# Patient Record
Sex: FEMALE | Race: WHITE | URBAN - METROPOLITAN AREA
[De-identification: names, ages, dates, MRNs, and addresses within clinical notes are randomized per-mention and may not be internally consistent; named-entity substitution may affect disease eponyms.]

---

## 2017-11-06 PROBLEM — N39.0 UTI (URINARY TRACT INFECTION): Status: ACTIVE | Noted: 2017-11-06

## 2017-11-06 PROBLEM — A41.9 SEPSIS (HCC): Status: ACTIVE | Noted: 2017-11-06

## 2017-11-07 PROBLEM — E87.0 ACUTE HYPERNATREMIA: Status: ACTIVE | Noted: 2017-11-07

## 2017-11-07 PROBLEM — E86.0 DEHYDRATION: Status: ACTIVE | Noted: 2017-11-07

## 2017-11-07 PROBLEM — L89.159 DECUBITUS ULCER OF SACRAL REGION: Status: ACTIVE | Noted: 2017-11-07

## 2017-11-08 PROBLEM — E44.1 MILD PROTEIN-CALORIE MALNUTRITION (HCC): Status: ACTIVE | Noted: 2017-11-08

## 2017-11-10 PROBLEM — A41.9 SEPSIS (HCC): Status: RESOLVED | Noted: 2017-11-06 | Resolved: 2017-11-10

## 2017-11-10 PROBLEM — N39.0 UTI (URINARY TRACT INFECTION): Status: RESOLVED | Noted: 2017-11-06 | Resolved: 2017-11-10

## 2018-03-28 ENCOUNTER — HOSPITAL ENCOUNTER (OUTPATIENT)
Dept: INTERVENTIONAL RADIOLOGY/VASCULAR | Age: 78
Discharge: HOME | End: 2018-03-29

## 2018-03-28 VITALS
SYSTOLIC BLOOD PRESSURE: 108 MMHG | HEART RATE: 97 BPM | OXYGEN SATURATION: 98 % | DIASTOLIC BLOOD PRESSURE: 56 MMHG | RESPIRATION RATE: 24 BRPM

## 2018-03-28 DIAGNOSIS — R32 URINARY INCONTINENCE, UNSPECIFIED TYPE: ICD-10-CM

## 2018-03-28 PROBLEM — N10 PYELONEPHRITIS, ACUTE: Status: ACTIVE | Noted: 2018-03-28

## 2018-03-28 PROBLEM — N13.9 UROPATHY, OBSTRUCTIVE: Status: ACTIVE | Noted: 2018-03-28

## 2018-03-28 PROBLEM — R65.20 SEVERE SEPSIS (HCC): Status: ACTIVE | Noted: 2018-03-28

## 2018-03-28 PROBLEM — A41.9 SEVERE SEPSIS (HCC): Status: ACTIVE | Noted: 2018-03-28

## 2018-03-28 RX ORDER — DIPHENHYDRAMINE HYDROCHLORIDE 50 MG/ML
12.5 INJECTION INTRAMUSCULAR; INTRAVENOUS ONCE
Status: COMPLETED | OUTPATIENT
Start: 2018-03-28 | End: 2018-03-28

## 2018-03-28 RX ADMIN — DIPHENHYDRAMINE HYDROCHLORIDE 12.5 MG: 50 INJECTION INTRAMUSCULAR; INTRAVENOUS at 22:03

## 2018-04-12 PROBLEM — E86.0 DEHYDRATION: Status: RESOLVED | Noted: 2017-11-07 | Resolved: 2018-04-12

## 2018-12-23 ENCOUNTER — HOSPITAL ENCOUNTER (INPATIENT)
Age: 78
LOS: 5 days | Discharge: HOME | DRG: 438 | End: 2018-12-28
Attending: EMERGENCY MEDICINE | Admitting: INTERNAL MEDICINE
Payer: MEDICARE

## 2018-12-23 ENCOUNTER — APPOINTMENT (OUTPATIENT)
Dept: CT IMAGING | Age: 78
DRG: 438 | End: 2018-12-23
Payer: MEDICARE

## 2018-12-23 ENCOUNTER — APPOINTMENT (OUTPATIENT)
Dept: GENERAL RADIOLOGY | Age: 78
DRG: 438 | End: 2018-12-23
Payer: MEDICARE

## 2018-12-23 DIAGNOSIS — K85.90 ACUTE PANCREATITIS, UNSPECIFIED COMPLICATION STATUS, UNSPECIFIED PANCREATITIS TYPE: Primary | ICD-10-CM

## 2018-12-23 DIAGNOSIS — R11.2 NON-INTRACTABLE VOMITING WITH NAUSEA, UNSPECIFIED VOMITING TYPE: ICD-10-CM

## 2018-12-23 DIAGNOSIS — N30.01 ACUTE CYSTITIS WITH HEMATURIA: ICD-10-CM

## 2018-12-23 LAB
A/G RATIO: 1.1 (ref 1.1–2.2)
ALBUMIN SERPL-MCNC: 4.1 G/DL (ref 3.4–5)
ALP BLD-CCNC: 55 U/L (ref 40–129)
ALT SERPL-CCNC: 10 U/L (ref 10–40)
AMORPHOUS: ABNORMAL /HPF
ANION GAP SERPL CALCULATED.3IONS-SCNC: 13 MMOL/L (ref 3–16)
AST SERPL-CCNC: 18 U/L (ref 15–37)
BACTERIA: ABNORMAL /HPF
BASOPHILS ABSOLUTE: 0 K/UL (ref 0–0.2)
BASOPHILS RELATIVE PERCENT: 0.2 %
BILIRUB SERPL-MCNC: 0.6 MG/DL (ref 0–1)
BILIRUBIN URINE: NEGATIVE
BLOOD, URINE: ABNORMAL
BUN BLDV-MCNC: 21 MG/DL (ref 7–20)
CALCIUM SERPL-MCNC: 9.7 MG/DL (ref 8.3–10.6)
CHLORIDE BLD-SCNC: 104 MMOL/L (ref 99–110)
CLARITY: ABNORMAL
CO2: 24 MMOL/L (ref 21–32)
COLOR: YELLOW
CREAT SERPL-MCNC: 0.9 MG/DL (ref 0.6–1.2)
EKG ATRIAL RATE: 78 BPM
EKG DIAGNOSIS: NORMAL
EKG P AXIS: 76 DEGREES
EKG P-R INTERVAL: 166 MS
EKG Q-T INTERVAL: 392 MS
EKG QRS DURATION: 96 MS
EKG QTC CALCULATION (BAZETT): 446 MS
EKG R AXIS: -23 DEGREES
EKG T AXIS: -13 DEGREES
EKG VENTRICULAR RATE: 78 BPM
EOSINOPHILS ABSOLUTE: 0 K/UL (ref 0–0.6)
EOSINOPHILS RELATIVE PERCENT: 0.1 %
EPITHELIAL CELLS, UA: ABNORMAL /HPF
GFR AFRICAN AMERICAN: >60
GFR NON-AFRICAN AMERICAN: >60
GLOBULIN: 3.7 G/DL
GLUCOSE BLD-MCNC: 129 MG/DL (ref 70–99)
GLUCOSE URINE: NEGATIVE MG/DL
HCT VFR BLD CALC: 44.2 % (ref 36–48)
HEMOGLOBIN: 15.1 G/DL (ref 12–16)
KETONES, URINE: NEGATIVE MG/DL
LEUKOCYTE ESTERASE, URINE: ABNORMAL
LIPASE: >600 U/L (ref 13–60)
LYMPHOCYTES ABSOLUTE: 0.6 K/UL (ref 1–5.1)
LYMPHOCYTES RELATIVE PERCENT: 5.9 %
MCH RBC QN AUTO: 32.5 PG (ref 26–34)
MCHC RBC AUTO-ENTMCNC: 34.1 G/DL (ref 31–36)
MCV RBC AUTO: 95.1 FL (ref 80–100)
MICROSCOPIC EXAMINATION: YES
MONOCYTES ABSOLUTE: 0.4 K/UL (ref 0–1.3)
MONOCYTES RELATIVE PERCENT: 3.8 %
MUCUS: ABNORMAL /LPF
NEUTROPHILS ABSOLUTE: 9.8 K/UL (ref 1.7–7.7)
NEUTROPHILS RELATIVE PERCENT: 90 %
NITRITE, URINE: POSITIVE
PDW BLD-RTO: 13.4 % (ref 12.4–15.4)
PH UA: 5.5
PLATELET # BLD: 240 K/UL (ref 135–450)
PMV BLD AUTO: 7.5 FL (ref 5–10.5)
POTASSIUM SERPL-SCNC: 4.4 MMOL/L (ref 3.5–5.1)
PROTEIN UA: 100 MG/DL
RBC # BLD: 4.64 M/UL (ref 4–5.2)
RBC UA: ABNORMAL /HPF (ref 0–2)
SODIUM BLD-SCNC: 141 MMOL/L (ref 136–145)
SPECIFIC GRAVITY UA: >=1.03
TOTAL PROTEIN: 7.8 G/DL (ref 6.4–8.2)
TROPONIN: <0.01 NG/ML
URINE REFLEX TO CULTURE: YES
URINE TYPE: ABNORMAL
UROBILINOGEN, URINE: 0.2 E.U./DL
WBC # BLD: 10.9 K/UL (ref 4–11)
WBC UA: >100 /HPF (ref 0–5)

## 2018-12-23 PROCEDURE — 96365 THER/PROPH/DIAG IV INF INIT: CPT

## 2018-12-23 PROCEDURE — 93005 ELECTROCARDIOGRAM TRACING: CPT | Performed by: PHYSICIAN ASSISTANT

## 2018-12-23 PROCEDURE — 2580000003 HC RX 258: Performed by: PHYSICIAN ASSISTANT

## 2018-12-23 PROCEDURE — 6360000002 HC RX W HCPCS: Performed by: INTERNAL MEDICINE

## 2018-12-23 PROCEDURE — 85025 COMPLETE CBC W/AUTO DIFF WBC: CPT

## 2018-12-23 PROCEDURE — 83690 ASSAY OF LIPASE: CPT

## 2018-12-23 PROCEDURE — 87801 DETECT AGNT MULT DNA AMPLI: CPT

## 2018-12-23 PROCEDURE — 2500000003 HC RX 250 WO HCPCS: Performed by: INTERNAL MEDICINE

## 2018-12-23 PROCEDURE — 96375 TX/PRO/DX INJ NEW DRUG ADDON: CPT

## 2018-12-23 PROCEDURE — 87186 SC STD MICRODIL/AGAR DIL: CPT

## 2018-12-23 PROCEDURE — 87077 CULTURE AEROBIC IDENTIFY: CPT

## 2018-12-23 PROCEDURE — 71045 X-RAY EXAM CHEST 1 VIEW: CPT

## 2018-12-23 PROCEDURE — C9113 INJ PANTOPRAZOLE SODIUM, VIA: HCPCS | Performed by: INTERNAL MEDICINE

## 2018-12-23 PROCEDURE — 93010 ELECTROCARDIOGRAM REPORT: CPT | Performed by: INTERNAL MEDICINE

## 2018-12-23 PROCEDURE — 6360000004 HC RX CONTRAST MEDICATION: Performed by: EMERGENCY MEDICINE

## 2018-12-23 PROCEDURE — 87086 URINE CULTURE/COLONY COUNT: CPT

## 2018-12-23 PROCEDURE — 2580000003 HC RX 258: Performed by: INTERNAL MEDICINE

## 2018-12-23 PROCEDURE — 6360000002 HC RX W HCPCS: Performed by: PHYSICIAN ASSISTANT

## 2018-12-23 PROCEDURE — 96361 HYDRATE IV INFUSION ADD-ON: CPT

## 2018-12-23 PROCEDURE — 99285 EMERGENCY DEPT VISIT HI MDM: CPT

## 2018-12-23 PROCEDURE — 81001 URINALYSIS AUTO W/SCOPE: CPT

## 2018-12-23 PROCEDURE — 1200000000 HC SEMI PRIVATE

## 2018-12-23 PROCEDURE — 84484 ASSAY OF TROPONIN QUANT: CPT

## 2018-12-23 PROCEDURE — 80053 COMPREHEN METABOLIC PANEL: CPT

## 2018-12-23 PROCEDURE — 74177 CT ABD & PELVIS W/CONTRAST: CPT

## 2018-12-23 PROCEDURE — 87040 BLOOD CULTURE FOR BACTERIA: CPT

## 2018-12-23 RX ORDER — MORPHINE SULFATE 2 MG/ML
1 INJECTION, SOLUTION INTRAMUSCULAR; INTRAVENOUS EVERY 4 HOURS PRN
Status: DISCONTINUED | OUTPATIENT
Start: 2018-12-23 | End: 2018-12-28 | Stop reason: HOSPADM

## 2018-12-23 RX ORDER — SODIUM CHLORIDE 9 MG/ML
INJECTION, SOLUTION INTRAVENOUS
Status: DISPENSED
Start: 2018-12-23 | End: 2018-12-24

## 2018-12-23 RX ORDER — 0.9 % SODIUM CHLORIDE 0.9 %
500 INTRAVENOUS SOLUTION INTRAVENOUS ONCE
Status: COMPLETED | OUTPATIENT
Start: 2018-12-23 | End: 2018-12-23

## 2018-12-23 RX ORDER — SODIUM CHLORIDE 0.9 % (FLUSH) 0.9 %
10 SYRINGE (ML) INJECTION EVERY 12 HOURS SCHEDULED
Status: DISCONTINUED | OUTPATIENT
Start: 2018-12-23 | End: 2018-12-28 | Stop reason: HOSPADM

## 2018-12-23 RX ORDER — DEXTROSE, SODIUM CHLORIDE, AND POTASSIUM CHLORIDE 5; .9; .15 G/100ML; G/100ML; G/100ML
INJECTION INTRAVENOUS CONTINUOUS
Status: DISCONTINUED | OUTPATIENT
Start: 2018-12-23 | End: 2018-12-27

## 2018-12-23 RX ORDER — ONDANSETRON 2 MG/ML
4 INJECTION INTRAMUSCULAR; INTRAVENOUS EVERY 6 HOURS PRN
Status: DISCONTINUED | OUTPATIENT
Start: 2018-12-23 | End: 2018-12-28 | Stop reason: HOSPADM

## 2018-12-23 RX ORDER — ONDANSETRON 2 MG/ML
4 INJECTION INTRAMUSCULAR; INTRAVENOUS ONCE
Status: COMPLETED | OUTPATIENT
Start: 2018-12-23 | End: 2018-12-23

## 2018-12-23 RX ORDER — SODIUM CHLORIDE 0.9 % (FLUSH) 0.9 %
10 SYRINGE (ML) INJECTION PRN
Status: DISCONTINUED | OUTPATIENT
Start: 2018-12-23 | End: 2018-12-28 | Stop reason: HOSPADM

## 2018-12-23 RX ORDER — PANTOPRAZOLE SODIUM 40 MG/10ML
40 INJECTION, POWDER, LYOPHILIZED, FOR SOLUTION INTRAVENOUS
Status: DISCONTINUED | OUTPATIENT
Start: 2018-12-23 | End: 2018-12-26

## 2018-12-23 RX ADMIN — ENOXAPARIN SODIUM 40 MG: 40 INJECTION SUBCUTANEOUS at 19:47

## 2018-12-23 RX ADMIN — IOPAMIDOL 75 ML: 755 INJECTION, SOLUTION INTRAVENOUS at 14:39

## 2018-12-23 RX ADMIN — ONDANSETRON 4 MG: 2 INJECTION INTRAMUSCULAR; INTRAVENOUS at 14:11

## 2018-12-23 RX ADMIN — SODIUM CHLORIDE 500 ML: 9 INJECTION, SOLUTION INTRAVENOUS at 14:11

## 2018-12-23 RX ADMIN — Medication 10 ML: at 19:47

## 2018-12-23 RX ADMIN — CEFTRIAXONE SODIUM 1 G: 1 INJECTION, POWDER, FOR SOLUTION INTRAMUSCULAR; INTRAVENOUS at 16:59

## 2018-12-23 RX ADMIN — DEXTROSE MONOHYDRATE 500 MG: 50 INJECTION, SOLUTION INTRAVENOUS at 19:48

## 2018-12-23 RX ADMIN — PANTOPRAZOLE SODIUM 40 MG: 40 INJECTION, POWDER, FOR SOLUTION INTRAVENOUS at 19:48

## 2018-12-23 RX ADMIN — POTASSIUM CHLORIDE, DEXTROSE MONOHYDRATE AND SODIUM CHLORIDE: 150; 5; 900 INJECTION, SOLUTION INTRAVENOUS at 19:48

## 2018-12-24 LAB
ANION GAP SERPL CALCULATED.3IONS-SCNC: 7 MMOL/L (ref 3–16)
BUN BLDV-MCNC: 16 MG/DL (ref 7–20)
CALCIUM SERPL-MCNC: 9 MG/DL (ref 8.3–10.6)
CHLORIDE BLD-SCNC: 102 MMOL/L (ref 99–110)
CO2: 25 MMOL/L (ref 21–32)
CREAT SERPL-MCNC: 0.7 MG/DL (ref 0.6–1.2)
GFR AFRICAN AMERICAN: >60
GFR NON-AFRICAN AMERICAN: >60
GLUCOSE BLD-MCNC: 100 MG/DL (ref 70–99)
HCT VFR BLD CALC: 39 % (ref 36–48)
HEMOGLOBIN: 13.4 G/DL (ref 12–16)
MCH RBC QN AUTO: 32.8 PG (ref 26–34)
MCHC RBC AUTO-ENTMCNC: 34.2 G/DL (ref 31–36)
MCV RBC AUTO: 95.8 FL (ref 80–100)
PDW BLD-RTO: 13.2 % (ref 12.4–15.4)
PLATELET # BLD: 193 K/UL (ref 135–450)
PMV BLD AUTO: 7.7 FL (ref 5–10.5)
POTASSIUM REFLEX MAGNESIUM: 4.1 MMOL/L (ref 3.5–5.1)
RBC # BLD: 4.07 M/UL (ref 4–5.2)
REPORT: NORMAL
SODIUM BLD-SCNC: 134 MMOL/L (ref 136–145)
WBC # BLD: 7 K/UL (ref 4–11)

## 2018-12-24 PROCEDURE — G0009 ADMIN PNEUMOCOCCAL VACCINE: HCPCS | Performed by: INTERNAL MEDICINE

## 2018-12-24 PROCEDURE — 85027 COMPLETE CBC AUTOMATED: CPT

## 2018-12-24 PROCEDURE — 90670 PCV13 VACCINE IM: CPT | Performed by: INTERNAL MEDICINE

## 2018-12-24 PROCEDURE — 6360000002 HC RX W HCPCS: Performed by: INTERNAL MEDICINE

## 2018-12-24 PROCEDURE — 80048 BASIC METABOLIC PNL TOTAL CA: CPT

## 2018-12-24 PROCEDURE — 2580000003 HC RX 258: Performed by: INTERNAL MEDICINE

## 2018-12-24 PROCEDURE — 2500000003 HC RX 250 WO HCPCS: Performed by: INTERNAL MEDICINE

## 2018-12-24 PROCEDURE — 36415 COLL VENOUS BLD VENIPUNCTURE: CPT

## 2018-12-24 PROCEDURE — 1200000000 HC SEMI PRIVATE

## 2018-12-24 PROCEDURE — C9113 INJ PANTOPRAZOLE SODIUM, VIA: HCPCS | Performed by: INTERNAL MEDICINE

## 2018-12-24 RX ADMIN — Medication 10 ML: at 09:02

## 2018-12-24 RX ADMIN — PNEUMOCOCCAL 13-VALENT CONJUGATE VACCINE 0.5 ML: 2.2; 2.2; 2.2; 2.2; 2.2; 4.4; 2.2; 2.2; 2.2; 2.2; 2.2; 2.2; 2.2 INJECTION, SUSPENSION INTRAMUSCULAR at 09:02

## 2018-12-24 RX ADMIN — CEFTRIAXONE SODIUM 1 G: 1 INJECTION, POWDER, FOR SOLUTION INTRAMUSCULAR; INTRAVENOUS at 16:54

## 2018-12-24 RX ADMIN — VANCOMYCIN HYDROCHLORIDE 1250 MG: 1 INJECTION, POWDER, LYOPHILIZED, FOR SOLUTION INTRAVENOUS at 17:53

## 2018-12-24 RX ADMIN — ENOXAPARIN SODIUM 40 MG: 40 INJECTION SUBCUTANEOUS at 17:53

## 2018-12-24 RX ADMIN — PANTOPRAZOLE SODIUM 40 MG: 40 INJECTION, POWDER, FOR SOLUTION INTRAVENOUS at 06:07

## 2018-12-24 RX ADMIN — POTASSIUM CHLORIDE, DEXTROSE MONOHYDRATE AND SODIUM CHLORIDE: 150; 5; 900 INJECTION, SOLUTION INTRAVENOUS at 15:41

## 2018-12-24 ASSESSMENT — PAIN SCALES - PAIN ASSESSMENT IN ADVANCED DEMENTIA (PAINAD)
NEGVOCALIZATION: 0
TOTALSCORE: 0
BREATHING: 0
NEGVOCALIZATION: 0
CONSOLABILITY: 0
FACIALEXPRESSION: 0
BODYLANGUAGE: 0
TOTALSCORE: 0
BODYLANGUAGE: 0
BREATHING: 0
FACIALEXPRESSION: 0
CONSOLABILITY: 0

## 2018-12-24 ASSESSMENT — ENCOUNTER SYMPTOMS: COUGH: 0

## 2018-12-24 NOTE — H&P
ENT grossly normal.  NECK:  Supple. No cervical lymph nodes. Thyroid normal.  No carotid  bruit. HEART:  Regular rhythm. No murmur. No gallop. LUNGS:  Slightly diminished air entry, bilateral scattered rhonchi. No  rales. No wheezing. No bronchial breathing  ABDOMEN:  Soft, no distention. Minimal upper abdominal discomfort. No  masses. No ascites. Bowel sounds normal.  CENTRAL NERVOUS SYSTEM:  Difficult to evaluate, especially nonverbal,  appears to move all four extremities. EXTREMITIES:  No ankle edema. No skin rash. No calf tenderness. Joints normal.    ADMISSION IMPRESSION:  1. Acute pancreatitis of unclear origin. 2.  Urinary tract infection. 3.  Possible pneumonia. 4.  Possible dementia. PLAN:  Admit the patient to medical unit for close observation. IV  fluid. Follow blood culture report and urine culture report. Broad-spectrum antibiotic coverage. Further GI workup as needed.         Tainsha Bond MD    D: 12/24/2018 11:31:35       T: 12/24/2018 12:09:26     [id number]  Job#: [id number]     Doc#: [id number]    CC:

## 2018-12-25 LAB
A/G RATIO: 1.2 (ref 1.1–2.2)
ALBUMIN SERPL-MCNC: 3.6 G/DL (ref 3.4–5)
ALP BLD-CCNC: 44 U/L (ref 40–129)
ALT SERPL-CCNC: 9 U/L (ref 10–40)
ANION GAP SERPL CALCULATED.3IONS-SCNC: 11 MMOL/L (ref 3–16)
AST SERPL-CCNC: 24 U/L (ref 15–37)
BILIRUB SERPL-MCNC: 0.4 MG/DL (ref 0–1)
BUN BLDV-MCNC: 6 MG/DL (ref 7–20)
CALCIUM SERPL-MCNC: 8.2 MG/DL (ref 8.3–10.6)
CHLORIDE BLD-SCNC: 103 MMOL/L (ref 99–110)
CO2: 24 MMOL/L (ref 21–32)
CREAT SERPL-MCNC: 0.7 MG/DL (ref 0.6–1.2)
GFR AFRICAN AMERICAN: >60
GFR NON-AFRICAN AMERICAN: >60
GLOBULIN: 2.9 G/DL
GLUCOSE BLD-MCNC: 103 MG/DL (ref 70–99)
LIPASE: 24 U/L (ref 13–60)
ORGANISM: ABNORMAL
POTASSIUM SERPL-SCNC: 4.1 MMOL/L (ref 3.5–5.1)
SODIUM BLD-SCNC: 138 MMOL/L (ref 136–145)
TOTAL PROTEIN: 6.5 G/DL (ref 6.4–8.2)
URINE CULTURE, ROUTINE: ABNORMAL
URINE CULTURE, ROUTINE: ABNORMAL

## 2018-12-25 PROCEDURE — 6360000002 HC RX W HCPCS: Performed by: INTERNAL MEDICINE

## 2018-12-25 PROCEDURE — C9113 INJ PANTOPRAZOLE SODIUM, VIA: HCPCS | Performed by: INTERNAL MEDICINE

## 2018-12-25 PROCEDURE — 36415 COLL VENOUS BLD VENIPUNCTURE: CPT

## 2018-12-25 PROCEDURE — 2500000003 HC RX 250 WO HCPCS: Performed by: INTERNAL MEDICINE

## 2018-12-25 PROCEDURE — 83690 ASSAY OF LIPASE: CPT

## 2018-12-25 PROCEDURE — 1200000000 HC SEMI PRIVATE

## 2018-12-25 PROCEDURE — 2580000003 HC RX 258: Performed by: INTERNAL MEDICINE

## 2018-12-25 PROCEDURE — 80053 COMPREHEN METABOLIC PANEL: CPT

## 2018-12-25 RX ADMIN — VANCOMYCIN HYDROCHLORIDE 1250 MG: 1 INJECTION, POWDER, LYOPHILIZED, FOR SOLUTION INTRAVENOUS at 18:21

## 2018-12-25 RX ADMIN — ENOXAPARIN SODIUM 40 MG: 40 INJECTION SUBCUTANEOUS at 18:21

## 2018-12-25 RX ADMIN — POTASSIUM CHLORIDE, DEXTROSE MONOHYDRATE AND SODIUM CHLORIDE: 150; 5; 900 INJECTION, SOLUTION INTRAVENOUS at 21:01

## 2018-12-25 RX ADMIN — PANTOPRAZOLE SODIUM 40 MG: 40 INJECTION, POWDER, FOR SOLUTION INTRAVENOUS at 06:54

## 2018-12-25 RX ADMIN — CEFTRIAXONE SODIUM 1 G: 1 INJECTION, POWDER, FOR SOLUTION INTRAMUSCULAR; INTRAVENOUS at 17:33

## 2018-12-25 ASSESSMENT — PAIN SCALES - PAIN ASSESSMENT IN ADVANCED DEMENTIA (PAINAD)
CONSOLABILITY: 0
NEGVOCALIZATION: 0
CONSOLABILITY: 0
FACIALEXPRESSION: 0
NEGVOCALIZATION: 0
BREATHING: 0
TOTALSCORE: 0
CONSOLABILITY: 0
NEGVOCALIZATION: 0
BODYLANGUAGE: 0
BREATHING: 0
NEGVOCALIZATION: 0
TOTALSCORE: 0
NEGVOCALIZATION: 0
CONSOLABILITY: 0
NEGVOCALIZATION: 0
NEGVOCALIZATION: 0
BODYLANGUAGE: 0
BREATHING: 0
BREATHING: 0
FACIALEXPRESSION: 0
CONSOLABILITY: 0
BREATHING: 0
BODYLANGUAGE: 0
TOTALSCORE: 0
CONSOLABILITY: 0
BREATHING: 0
CONSOLABILITY: 0
FACIALEXPRESSION: 0
CONSOLABILITY: 0
CONSOLABILITY: 0
NEGVOCALIZATION: 0
BREATHING: 0
BODYLANGUAGE: 0
TOTALSCORE: 0
BODYLANGUAGE: 0
TOTALSCORE: 0
TOTALSCORE: 0
BODYLANGUAGE: 0
FACIALEXPRESSION: 0
BODYLANGUAGE: 0
TOTALSCORE: 0
NEGVOCALIZATION: 0
FACIALEXPRESSION: 0
NEGVOCALIZATION: 0
FACIALEXPRESSION: 0
NEGVOCALIZATION: 0
NEGVOCALIZATION: 0
FACIALEXPRESSION: 0
CONSOLABILITY: 0
TOTALSCORE: 0
BREATHING: 0
BODYLANGUAGE: 0
TOTALSCORE: 0
FACIALEXPRESSION: 0
FACIALEXPRESSION: 0
TOTALSCORE: 0
BREATHING: 0
BODYLANGUAGE: 0
BODYLANGUAGE: 0
FACIALEXPRESSION: 0
FACIALEXPRESSION: 0
BODYLANGUAGE: 0
CONSOLABILITY: 0
BODYLANGUAGE: 0
BREATHING: 0
NEGVOCALIZATION: 0
FACIALEXPRESSION: 0
BREATHING: 0
CONSOLABILITY: 0
FACIALEXPRESSION: 0
BODYLANGUAGE: 0
BREATHING: 0
BREATHING: 0
CONSOLABILITY: 0

## 2018-12-25 ASSESSMENT — ENCOUNTER SYMPTOMS: NAUSEA: 0

## 2018-12-26 LAB
CULTURE, BLOOD 2: ABNORMAL
ORGANISM: ABNORMAL
ORGANISM: ABNORMAL

## 2018-12-26 PROCEDURE — 1200000000 HC SEMI PRIVATE

## 2018-12-26 PROCEDURE — 6360000002 HC RX W HCPCS: Performed by: INTERNAL MEDICINE

## 2018-12-26 PROCEDURE — 2500000003 HC RX 250 WO HCPCS: Performed by: INTERNAL MEDICINE

## 2018-12-26 PROCEDURE — C9113 INJ PANTOPRAZOLE SODIUM, VIA: HCPCS | Performed by: INTERNAL MEDICINE

## 2018-12-26 PROCEDURE — 2580000003 HC RX 258: Performed by: INTERNAL MEDICINE

## 2018-12-26 RX ORDER — PANTOPRAZOLE SODIUM 40 MG/1
40 TABLET, DELAYED RELEASE ORAL
Status: DISCONTINUED | OUTPATIENT
Start: 2018-12-27 | End: 2018-12-28 | Stop reason: HOSPADM

## 2018-12-26 RX ADMIN — ENOXAPARIN SODIUM 40 MG: 40 INJECTION SUBCUTANEOUS at 17:37

## 2018-12-26 RX ADMIN — Medication 10 ML: at 08:40

## 2018-12-26 RX ADMIN — POTASSIUM CHLORIDE, DEXTROSE MONOHYDRATE AND SODIUM CHLORIDE: 150; 5; 900 INJECTION, SOLUTION INTRAVENOUS at 20:02

## 2018-12-26 RX ADMIN — CEFTRIAXONE SODIUM 1 G: 1 INJECTION, POWDER, FOR SOLUTION INTRAMUSCULAR; INTRAVENOUS at 17:37

## 2018-12-26 RX ADMIN — Medication 10 ML: at 20:02

## 2018-12-26 RX ADMIN — PANTOPRAZOLE SODIUM 40 MG: 40 INJECTION, POWDER, FOR SOLUTION INTRAVENOUS at 05:16

## 2018-12-26 RX ADMIN — POTASSIUM CHLORIDE, DEXTROSE MONOHYDRATE AND SODIUM CHLORIDE: 150; 5; 900 INJECTION, SOLUTION INTRAVENOUS at 10:07

## 2018-12-26 ASSESSMENT — PAIN SCALES - PAIN ASSESSMENT IN ADVANCED DEMENTIA (PAINAD)
NEGVOCALIZATION: 0
FACIALEXPRESSION: 0
NEGVOCALIZATION: 0
BODYLANGUAGE: 0
CONSOLABILITY: 0
BODYLANGUAGE: 0
FACIALEXPRESSION: 0
FACIALEXPRESSION: 0
NEGVOCALIZATION: 0
BODYLANGUAGE: 0
NEGVOCALIZATION: 0
BODYLANGUAGE: 0
BREATHING: 0
CONSOLABILITY: 0
FACIALEXPRESSION: 0
TOTALSCORE: 0
FACIALEXPRESSION: 0
BREATHING: 0
CONSOLABILITY: 0
FACIALEXPRESSION: 0
TOTALSCORE: 0
CONSOLABILITY: 0
NEGVOCALIZATION: 0
NEGVOCALIZATION: 0
TOTALSCORE: 0
BODYLANGUAGE: 0
BREATHING: 0
BODYLANGUAGE: 0
TOTALSCORE: 0
BODYLANGUAGE: 0
TOTALSCORE: 0
BODYLANGUAGE: 0
FACIALEXPRESSION: 0
CONSOLABILITY: 0
FACIALEXPRESSION: 0
NEGVOCALIZATION: 0
TOTALSCORE: 0
BREATHING: 0
TOTALSCORE: 0
FACIALEXPRESSION: 0
TOTALSCORE: 0
BODYLANGUAGE: 0
NEGVOCALIZATION: 0
TOTALSCORE: 0
CONSOLABILITY: 0
CONSOLABILITY: 0
TOTALSCORE: 0
BREATHING: 0
BREATHING: 0
CONSOLABILITY: 0
CONSOLABILITY: 0
BODYLANGUAGE: 0
BREATHING: 0
NEGVOCALIZATION: 0
NEGVOCALIZATION: 0
TOTALSCORE: 0
CONSOLABILITY: 0
BREATHING: 0
BODYLANGUAGE: 0
NEGVOCALIZATION: 0
CONSOLABILITY: 0

## 2018-12-26 ASSESSMENT — ENCOUNTER SYMPTOMS: NAUSEA: 0

## 2018-12-26 NOTE — CARE COORDINATION
INTERDISCIPLINARY PLAN OF CARE CONFERENCE    Date/Time: 12/26/2018 3:06 PM  Completed by: Reggie Ewing, Case Management      Patient Name:  Pk Esposito  YOB: 1940  Admitting Diagnosis: Pancreatitis, unspecified pancreatitis type [K85.90]     Admit Date/Time:  12/23/2018 12:09 PM    Chart reviewed. Interdisciplinary team met to discuss patient progress and discharge plans. Disciplines included Case Management, Nursing, and Dietitian. Current Status:stable    PT/OT recommendation:n/a    Anticipated Discharge Date: TBD  Expected D/C Disposition:  Nursing Home  Confirmed plan with patient and/or family Yes  Discharge Plan Comments: Reviewed chart. Plan continues [facility] L[facility],+[facility]OC. Crystal Romano with [facility] states pt can return at d/c. Will need to verify d/c plan with family. Following.           Home O2 in place on admit: to ECF  Pt informed of need to bring portable home O2 tank on day of discharge for nursing to connect prior to leaving:  Not Indicated  Verbalized agreement/Understanding:  Not Indicated

## 2018-12-26 NOTE — ED PROVIDER NOTES
I independently performed a history and physical on Deisy Murphy. All diagnostic, treatment, and disposition decisions were made by myself in conjunction with the advanced practice provider. For further details of Chantelle Su's emergency department encounter, please see the advance practice provider's documentation. In brief, this is a 66y.o. year old female, with   Chief Complaint   Patient presents with    Emesis     Per squad, pt woke up this morning with nausea and vomiting. EMT was informed that pt is normally alert and talkative. Pt is not attempting to communicate at this time. Patient is non-verbal from a nursing facility. EMS brought the patient in for vomiting. On exam, patient is chronically ill in appearance but does not appear to be in acute distress. Her epigastrium is tender, but soft. CTAB.  RRR. Imaging shows acute cystitis, and lab analysis shows acute pancreatitis. Patient was started on antibiotics and admitted. EKG Interpretation    Interpreted by emergency department physician    Rhythm: normal sinus   Rate: normal  Axis: normal  Ectopy: none  Conduction: normal  ST Segments: nonspecific changes  T Waves: non specific changes  Q Waves: none    Clinical Impression: normal sinus rhythm, non-specific st and t wave changes.     Letty Roberson MD  12/26/18 2690
and no mass. There is no tenderness. There is no rigidity, no rebound, no guarding, no tenderness at McBurney's point and negative Kaiser's sign. No hernia. Musculoskeletal: She exhibits no edema. Neurological: She is alert and oriented to person, place, and time. Skin: Skin is warm and dry. She is not diaphoretic. No pallor. Psychiatric: She has a normal mood and affect. Her behavior is normal.   Nursing note and vitals reviewed. DIAGNOSTIC RESULTS   LABS:    Labs Reviewed   CBC WITH AUTO DIFFERENTIAL   COMPREHENSIVE METABOLIC PANEL   LIPASE   URINE RT REFLEX TO CULTURE   TROPONIN       All other labs were within normal range or not returned as of this dictation. EKG: All EKG's are interpreted by the Emergency Department Physician who either signs orCo-signs this chart in the absence of a cardiologist.  Please see their note for interpretation of EKG. RADIOLOGY:   Non-plain film images such as CT, Ultrasound and MRI are read by the radiologist. Plain radiographic images are visualized andpreliminarily interpreted by the  ED Provider with the below findings:        Interpretation perthe Radiologist below, if available at the time of this note:    CT ABDOMEN PELVIS W IV CONTRAST Additional Contrast? None   Final Result   1. Although relatively decompressed, there is greater than expected mucosal   thickening of the urinary bladder. Findings may reflect nonspecific   cystitis. Correlation with urinalysis is recommended. 2. Fluid-filled nondilated small bowel and colon is suggestive of diarrheal   state/nonspecific enterocolitis. 3. Moderate to large hiatal hernia, suspected paraesophageal type. upper GI   examination could be considered for further evaluation, as indicated. XR CHEST PORTABLE   Final Result   1. Possible right lower lobe infiltrate could represent pneumonia   2. Pulmonary vascular congestion           No results found.       PROCEDURES   Unless otherwise noted below,

## 2018-12-27 LAB
A/G RATIO: 1.4 (ref 1.1–2.2)
ALBUMIN SERPL-MCNC: 3.7 G/DL (ref 3.4–5)
ALP BLD-CCNC: 42 U/L (ref 40–129)
ALT SERPL-CCNC: 9 U/L (ref 10–40)
ANION GAP SERPL CALCULATED.3IONS-SCNC: 12 MMOL/L (ref 3–16)
AST SERPL-CCNC: 18 U/L (ref 15–37)
BASOPHILS ABSOLUTE: 0 K/UL (ref 0–0.2)
BASOPHILS RELATIVE PERCENT: 0.3 %
BILIRUB SERPL-MCNC: 0.4 MG/DL (ref 0–1)
BUN BLDV-MCNC: 4 MG/DL (ref 7–20)
CALCIUM SERPL-MCNC: 8.9 MG/DL (ref 8.3–10.6)
CHLORIDE BLD-SCNC: 104 MMOL/L (ref 99–110)
CO2: 25 MMOL/L (ref 21–32)
CREAT SERPL-MCNC: 0.6 MG/DL (ref 0.6–1.2)
EOSINOPHILS ABSOLUTE: 0.2 K/UL (ref 0–0.6)
EOSINOPHILS RELATIVE PERCENT: 3.7 %
GFR AFRICAN AMERICAN: >60
GFR NON-AFRICAN AMERICAN: >60
GLOBULIN: 2.6 G/DL
GLUCOSE BLD-MCNC: 95 MG/DL (ref 70–99)
HCT VFR BLD CALC: 38.2 % (ref 36–48)
HEMOGLOBIN: 13.1 G/DL (ref 12–16)
LYMPHOCYTES ABSOLUTE: 1.3 K/UL (ref 1–5.1)
LYMPHOCYTES RELATIVE PERCENT: 23.2 %
MCH RBC QN AUTO: 32.7 PG (ref 26–34)
MCHC RBC AUTO-ENTMCNC: 34.3 G/DL (ref 31–36)
MCV RBC AUTO: 95.2 FL (ref 80–100)
MONOCYTES ABSOLUTE: 0.5 K/UL (ref 0–1.3)
MONOCYTES RELATIVE PERCENT: 9.4 %
NEUTROPHILS ABSOLUTE: 3.7 K/UL (ref 1.7–7.7)
NEUTROPHILS RELATIVE PERCENT: 63.4 %
PDW BLD-RTO: 12.9 % (ref 12.4–15.4)
PLATELET # BLD: 202 K/UL (ref 135–450)
PMV BLD AUTO: 7.4 FL (ref 5–10.5)
POTASSIUM SERPL-SCNC: 3.8 MMOL/L (ref 3.5–5.1)
RBC # BLD: 4.01 M/UL (ref 4–5.2)
SODIUM BLD-SCNC: 141 MMOL/L (ref 136–145)
TOTAL PROTEIN: 6.3 G/DL (ref 6.4–8.2)
WBC # BLD: 5.8 K/UL (ref 4–11)

## 2018-12-27 PROCEDURE — 2580000003 HC RX 258: Performed by: INTERNAL MEDICINE

## 2018-12-27 PROCEDURE — 85025 COMPLETE CBC W/AUTO DIFF WBC: CPT

## 2018-12-27 PROCEDURE — 80053 COMPREHEN METABOLIC PANEL: CPT

## 2018-12-27 PROCEDURE — 6360000002 HC RX W HCPCS: Performed by: INTERNAL MEDICINE

## 2018-12-27 PROCEDURE — 6370000000 HC RX 637 (ALT 250 FOR IP): Performed by: INTERNAL MEDICINE

## 2018-12-27 PROCEDURE — 36415 COLL VENOUS BLD VENIPUNCTURE: CPT

## 2018-12-27 PROCEDURE — 1200000000 HC SEMI PRIVATE

## 2018-12-27 RX ADMIN — PANTOPRAZOLE SODIUM 40 MG: 40 TABLET, DELAYED RELEASE ORAL at 06:17

## 2018-12-27 RX ADMIN — CEFTRIAXONE SODIUM 1 G: 1 INJECTION, POWDER, FOR SOLUTION INTRAMUSCULAR; INTRAVENOUS at 16:28

## 2018-12-27 RX ADMIN — ENOXAPARIN SODIUM 40 MG: 40 INJECTION SUBCUTANEOUS at 17:35

## 2018-12-27 RX ADMIN — Medication 10 ML: at 21:18

## 2018-12-27 ASSESSMENT — ENCOUNTER SYMPTOMS
SHORTNESS OF BREATH: 0
COUGH: 0

## 2018-12-28 VITALS
HEART RATE: 77 BPM | WEIGHT: 172.7 LBS | TEMPERATURE: 98.5 F | DIASTOLIC BLOOD PRESSURE: 88 MMHG | RESPIRATION RATE: 16 BRPM | OXYGEN SATURATION: 96 % | BODY MASS INDEX: 33.91 KG/M2 | SYSTOLIC BLOOD PRESSURE: 126 MMHG | HEIGHT: 60 IN

## 2018-12-28 LAB — BLOOD CULTURE, ROUTINE: NORMAL

## 2018-12-28 PROCEDURE — 6370000000 HC RX 637 (ALT 250 FOR IP): Performed by: INTERNAL MEDICINE

## 2018-12-28 RX ORDER — CEPHALEXIN 250 MG/1
250 CAPSULE ORAL EVERY 6 HOURS SCHEDULED
Status: DISCONTINUED | OUTPATIENT
Start: 2018-12-28 | End: 2018-12-28 | Stop reason: HOSPADM

## 2018-12-28 RX ORDER — CEPHALEXIN 250 MG/1
250 CAPSULE ORAL 4 TIMES DAILY
Qty: 40 CAPSULE | Refills: 0 | Status: SHIPPED | OUTPATIENT
Start: 2018-12-28

## 2018-12-28 RX ADMIN — CEPHALEXIN 250 MG: 250 CAPSULE ORAL at 11:40

## 2018-12-28 RX ADMIN — PANTOPRAZOLE SODIUM 40 MG: 40 TABLET, DELAYED RELEASE ORAL at 06:05

## 2018-12-28 ASSESSMENT — ENCOUNTER SYMPTOMS
SHORTNESS OF BREATH: 0
NAUSEA: 0
COUGH: 0

## 2018-12-28 NOTE — PROGRESS NOTES
4 Eyes Skin Assessment     The patient is being assess for   Admission    I agree that 2 RN's have performed a thorough Head to Toe Skin Assessment on the patient. ALL assessment sites listed below have been assessed. Areas assessed by both nurses:   [x]   Head, Face, and Ears:  Small healing abrasion left forehead   [x]   Shoulders, Back, and Chest, Abdomen  [x]   Arms, Elbows, and Hands   [x]   Coccyx, Sacrum, and Ischium:  Blanchable redness coccyx  [x]   Legs, Feet, and Heels            **SHARE this note so that the co-signing nurse is able to place an eSignature**    Co-signer eSignature: Electronically signed by Noe Sahu RN on 12/24/18 at 3:21 AM    Does the Patient have Skin Breakdown?   No          Kailash Prevention initiated:  Yes   Wound Care Orders initiated:  NA      [facility] nurse consulted for Pressure Injury (Stage 3,4, Unstageable, DTI, NWPT, Complex wounds)and New or Established Ostomies:  NA      Primary Nurse eSignature: Electronically signed by Gabriela Mabry RN on 12/23/18 at 11:06 PM
AM assessment complete. No am meds due. Alert to self. Telesitter in place. Call light within reach.
Bedside report given to [facility]  pt in stable condition no needs at this time.  Call light within reach
Fidel Shelton is a 66 y.o. female patient. Current Facility-Administered Medications   Medication Dose Route Frequency Provider Last Rate Last Dose    pantoprazole (PROTONIX) tablet 40 mg  40 mg Oral QAM AC Janell Fitzgerald MD   40 mg at 12/28/18 0605    bisacodyl (DULCOLAX) EC tablet 10 mg  10 mg Oral Daily PRN Janell Fitzgerald MD        sodium chloride flush 0.9 % injection 10 mL  10 mL Intravenous 2 times per day Andreia VILLA MD   10 mL at 12/27/18 2118    sodium chloride flush 0.9 % injection 10 mL  10 mL Intravenous PRN Janell Fitzgerald MD        magnesium hydroxide (MILK OF MAGNESIA) 400 MG/5ML suspension 30 mL  30 mL Oral Daily PRN Janell Fitzgerald MD        ondansetron (ZOFRAN) injection 4 mg  4 mg Intravenous Q6H PRN Janell Fitzgerald MD        enoxaparin (LOVENOX) injection 40 mg  40 mg Subcutaneous Daily Janell Fitzgerald MD   40 mg at 12/27/18 1735    morphine (PF) injection 1 mg  1 mg Intravenous Q4H PRN Janell Fitzgerald MD        cefTRIAXone (ROCEPHIN) 1 g IVPB in 50 mL D5W minibag  1 g Intravenous Q24H Janell Fitzgerald MD   Stopped at 12/27/18 1658     No Known Allergies  Active Problems:    Pancreatitis, unspecified pancreatitis type  Resolved Problems:    * No resolved hospital problems. *    Blood pressure 135/85, pulse 78, temperature 98.8 °F (37.1 °C), temperature source Oral, resp. rate 16, height 5' (1.524 m), weight 172 lb 11.2 oz (78.3 kg), SpO2 95 %, not currently breastfeeding. Subjective:  Symptoms:  Stable. No shortness of breath or cough. Diet:  Adequate intake. No nausea. Objective:  General Appearance:  Comfortable. Vital signs: (most recent): Blood pressure 135/85, pulse 78, temperature 98.8 °F (37.1 °C), temperature source Oral, resp. rate 16, height 5' (1.524 m), weight 172 lb 11.2 oz (78.3 kg), SpO2 95 %, not currently breastfeeding. No fever. Lungs: There are decreased breath sounds. No rales or rhonchi. Heart: Regular rhythm. No murmur or gallop.    Abdomen:
Pt IV infiltrated at this time. IV removed and dressing applied. Pts right hand and arm swollen.
Pt awake and quiet in bed with respirations e/e. Call light within reach, will continue to monitor.
Pt pulling at iv. Ace wrap added to help protect. Water offered and declined. No s/s of distress will monitor.
Pt resting in bed, quiet, smiles when RN enters room, but no verbal communication that is understandable. Occasionally says 1-2 words that are unrelated to current situation. Pt appears comfortable. IV fluids infusing, fluids offered. Bed alarm set, telesitter in place. No needs at this time. Will continue to monitor.
Shift assessment complete. Vital signs stable. Pt unable to answer orientation questions. No verbal response to questions, occasionally smiles to something said, points to scenes on TV and says \"look at the white\". Calm, unable to follow commands such as squeeze hands or wiggle toes. Checked for incontience, turned to supine position. Iv infusing, site clean dry intact. No needs at this time. Will continue to monitor.
Shift report received from [facility]. Visitors present, patient is alert and smiling. telesitter present. IV's intact.   Will continue to monitor
(35.9 °C), temperature source Oral, resp. rate 16, height 5' (1.524 m), weight 172 lb 11.2 oz (78.3 kg), SpO2 96 %, not currently breastfeeding. No fever. Lungs:  She is not in respiratory distress. There are rhonchi. No rales or wheezes. Heart: Regular rhythm. No murmur or gallop. Abdomen: Abdomen is soft and non-distended. Bowel sounds are normal.   There is no abdominal tenderness. There is no mass. Extremities: There is no dependent edema. Neurological: (Non verbal). Assessment & Plan  Acute pancreatitis  UTI  Poss pneumonia  Plan. IV fluids & observation          Follow labs           IV abx  Liberty Coon MD  12/24/2018
Axillary, resp. rate 16, height 5' (1.524 m), weight 172 lb 11.2 oz (78.3 kg), SpO2 95 %, not currently breastfeeding. No fever. Output: Producing urine. Lungs:  She is not in respiratory distress. No rales or wheezes. Heart: Regular rhythm. No murmur. Abdomen: Abdomen is soft and non-distended. Bowel sounds are normal.   There is no abdominal tenderness. There is no mass. Extremities: There is no dependent edema. Neurological: (Non verbal). Assessment & Plan  UTI-klebsiella-sensitive to Rocephin  Poss staph in blood culture-on Vancomycin pending further info  Acute pancreatitis with elivated Lipase-better now  Poss pneumonia  Plan. Cont current IV abx    Cecilia Maldonado MD  12/25/2018

## 2018-12-28 NOTE — PLAN OF CARE
Problem: Falls - Risk of:  Goal: Will remain free from falls  Will remain free from falls   Outcome: Ongoing    Goal: Absence of physical injury  Absence of physical injury   Outcome: Ongoing      Problem: Risk for Impaired Skin Integrity  Goal: Tissue integrity - skin and mucous membranes  Structural intactness and normal physiological function of skin and  mucous membranes.    Outcome: Ongoing      Problem: Discharge Planning:  Goal: Discharged to appropriate level of care  Discharged to appropriate level of care   Outcome: Ongoing    Goal: Participates in care planning  Participates in care planning   Outcome: Ongoing      Problem: Airway Clearance - Ineffective:  Goal: Clear lung sounds  Clear lung sounds   Outcome: Ongoing    Goal: Ability to maintain a clear airway will improve  Ability to maintain a clear airway will improve   Outcome: Ongoing      Problem: Fluid Volume - Deficit:  Goal: Achieves intake and output within specified parameters  Achieves intake and output within specified parameters   Outcome: Ongoing      Problem: Gas Exchange - Impaired:  Goal: Levels of oxygenation will improve  Levels of oxygenation will improve   Outcome: Ongoing      Problem: Hyperthermia:  Goal: Ability to maintain a body temperature in the normal range will improve  Ability to maintain a body temperature in the normal range will improve   Outcome: Ongoing      Problem: Tobacco Use:  Goal: Will participate in inpatient tobacco-use cessation counseling  Will participate in inpatient tobacco-use cessation counseling   Outcome: Ongoing

## 2018-12-28 NOTE — PLAN OF CARE
Problem: Risk for Impaired Skin Integrity  Goal: Tissue integrity - skin and mucous membranes  Structural intactness and normal physiological function of skin and  mucous membranes.    Outcome: Ongoing      Problem: Discharge Planning:  Goal: Discharged to appropriate level of care  Discharged to appropriate level of care   Outcome: Ongoing      Problem: Airway Clearance - Ineffective:  Goal: Ability to maintain a clear airway will improve  Ability to maintain a clear airway will improve   Outcome: Ongoing

## 2018-12-28 NOTE — FLOWSHEET NOTE
12/27/18 2118   Vital Signs   Temp 99 °F (37.2 °C)   Pulse 75   Resp 16   /70   BP Location Left upper arm   Patient Position Semi fowlers   Level of Consciousness 0   MEWS Score 1   Oxygen Therapy   SpO2 94 %   O2 Device None (Room air)   Shift assessment complete; see flow sheet. Scheduled medications administered; see MAR. Call light and bedside table within reach, bed in low, locked position, side rails up, pt encouraged to call for assistance with ambulation or transfer. Pt denies further needs at this time. Nurse and staff will continue to monitor throughout shift.     AVASYS for pt safety

## 2018-12-30 NOTE — DISCHARGE SUMMARY
Deisy Traylor [address]                 [address] [address]                               DISCHARGE SUMMARY    PATIENT NAME: Mika COX                     :        1940  MED REC NO:   [id number]                          ROOM:       0225  ACCOUNT NO:   [de-identified]                           ADMIT DATE: 2018  PROVIDER:     Prisca Avendano MD                  DISCHARGE DATE:  2018    DISCHARGE DIAGNOSES:  1. Acute pancreatitis, resolved. 2.  UTI with Klebsiella. 3.  Pneumonia, possibly gram negative. MEDICAL HISTORY:  The patient is nonverbal and could not provide any  history. According to available history, this 30-year-old white female,  followed by Dr. Tanesha Farfan in a neighboring nursing home, was sent to  the emergency room with bouts of vomiting and what appeared to be  stomach pain and generalized weakness. The workup in the emergency room  showed presence of urinary tract infection and elevated lipase with  normal liver function and other electrolytes and also abdominal CT scan  negative for any acute abdominal pathology. The patient was given IV  fluid, started on IV antibiotic, and admitted for further evaluation and  treatment. HOSPITAL COURSE:  The patient was initially kept n.p.o. due to the  presence of increased lipase and possible acute pancreatitis. She was  given supportive IV fluids and other medication to make her comfortable. Also, broad-spectrum IV Rocephin was started for a possible pneumonia as  well as urinary tract infection pending the culture report. The patient  did not appear to have any acute abdomen, did not appear to be in any  distress from abdominal pain, and did not have anymore vomiting while in  the hospital.  Serum lipase repeated next morning was back to normal.   It is unclear why her serum lipase was that significantly elevated at  the time of evaluation in the emergency room.   However, the